# Patient Record
(demographics unavailable — no encounter records)

---

## 2024-10-02 NOTE — PHYSICAL EXAM
[No Acute Distress] : no acute distress [Normal Sclera/Conjunctiva] : normal sclera/conjunctiva [PERRL] : pupils equal round and reactive to light [EOMI] : extraocular movements intact [Normal Outer Ear/Nose] : the outer ears and nose were normal in appearance [Normal Oropharynx] : the oropharynx was normal [No JVD] : no jugular venous distention [No Lymphadenopathy] : no lymphadenopathy [Supple] : supple [Thyroid Normal, No Nodules] : the thyroid was normal and there were no nodules present [No Respiratory Distress] : no respiratory distress  [No Accessory Muscle Use] : no accessory muscle use [Clear to Auscultation] : lungs were clear to auscultation bilaterally [Normal Rate] : normal rate  [Regular Rhythm] : with a regular rhythm [Normal S1, S2] : normal S1 and S2 [No Murmur] : no murmur heard [No Varicosities] : no varicosities [Pedal Pulses Present] : the pedal pulses are present [No Edema] : there was no peripheral edema [No Extremity Clubbing/Cyanosis] : no extremity clubbing/cyanosis [Soft] : abdomen soft [Non Tender] : non-tender [Non-distended] : non-distended [No Masses] : no abdominal mass palpated [No HSM] : no HSM [Normal Bowel Sounds] : normal bowel sounds [No CVA Tenderness] : no CVA  tenderness [No Spinal Tenderness] : no spinal tenderness [No Joint Swelling] : no joint swelling [Grossly Normal Strength/Tone] : grossly normal strength/tone [No Rash] : no rash [Coordination Grossly Intact] : coordination grossly intact [No Focal Deficits] : no focal deficits [Normal Gait] : normal gait [Deep Tendon Reflexes (DTR)] : deep tendon reflexes were 2+ and symmetric [Normal Insight/Judgement] : insight and judgment were intact [Normal Affect] : the affect was normal

## 2024-10-02 NOTE — PHYSICAL EXAM
[No Acute Distress] : no acute distress [PERRL] : pupils equal round and reactive to light [Normal Sclera/Conjunctiva] : normal sclera/conjunctiva [EOMI] : extraocular movements intact [Normal Outer Ear/Nose] : the outer ears and nose were normal in appearance [Normal Oropharynx] : the oropharynx was normal [No JVD] : no jugular venous distention [No Lymphadenopathy] : no lymphadenopathy [Supple] : supple [Thyroid Normal, No Nodules] : the thyroid was normal and there were no nodules present [No Respiratory Distress] : no respiratory distress  [No Accessory Muscle Use] : no accessory muscle use [Clear to Auscultation] : lungs were clear to auscultation bilaterally [Normal Rate] : normal rate  [Regular Rhythm] : with a regular rhythm [Normal S1, S2] : normal S1 and S2 [No Murmur] : no murmur heard [No Varicosities] : no varicosities [Pedal Pulses Present] : the pedal pulses are present [No Edema] : there was no peripheral edema [No Extremity Clubbing/Cyanosis] : no extremity clubbing/cyanosis [Soft] : abdomen soft [Non Tender] : non-tender [Non-distended] : non-distended [No Masses] : no abdominal mass palpated [No HSM] : no HSM [Normal Bowel Sounds] : normal bowel sounds [No CVA Tenderness] : no CVA  tenderness [No Spinal Tenderness] : no spinal tenderness [No Joint Swelling] : no joint swelling [Grossly Normal Strength/Tone] : grossly normal strength/tone [No Rash] : no rash [Coordination Grossly Intact] : coordination grossly intact [No Focal Deficits] : no focal deficits [Normal Gait] : normal gait [Deep Tendon Reflexes (DTR)] : deep tendon reflexes were 2+ and symmetric [Normal Affect] : the affect was normal [Normal Insight/Judgement] : insight and judgment were intact

## 2024-10-08 NOTE — HEALTH RISK ASSESSMENT
[Good] : ~his/her~  mood as  good [No] : In the past 12 months have you used drugs other than those required for medical reasons? No [No falls in past year] : Patient reported no falls in the past year [0] : 2) Feeling down, depressed, or hopeless: Not at all (0) [PHQ-2 Negative - No further assessment needed] : PHQ-2 Negative - No further assessment needed [Never] : Never [Patient reported mammogram was normal] : Patient reported mammogram was normal [Patient declined colonoscopy] : Patient declined colonoscopy [With Family] : lives with family [Unemployed] : unemployed [Single] : single [Fully functional (bathing, dressing, toileting, transferring, walking, feeding)] : Fully functional (bathing, dressing, toileting, transferring, walking, feeding) [Fully functional (using the telephone, shopping, preparing meals, housekeeping, doing laundry, using] : Fully functional and needs no help or supervision to perform IADLs (using the telephone, shopping, preparing meals, housekeeping, doing laundry, using transportation, managing medications and managing finances) [[id number]] : 0 [MammogramDate] : 4/24 [AdvancecareDate] : 10/8/24 [FreeTextEntry4] : With think about it

## 2024-10-08 NOTE — HISTORY OF PRESENT ILLNESS
[de-identified] : 62 years old female has past medical history of hypothyroidism, diabetes, hyperlipidemia, bipolar disorder, hypercalcemia, thyroid nodules, osteoporosis and vitamin D deficiency, came back for annual physical exam.

## 2024-10-08 NOTE — HEALTH RISK ASSESSMENT
[Good] : ~his/her~  mood as  good [No] : In the past 12 months have you used drugs other than those required for medical reasons? No [No falls in past year] : Patient reported no falls in the past year [0] : 2) Feeling down, depressed, or hopeless: Not at all (0) [PHQ-2 Negative - No further assessment needed] : PHQ-2 Negative - No further assessment needed [Never] : Never [Patient reported mammogram was normal] : Patient reported mammogram was normal [Patient declined colonoscopy] : Patient declined colonoscopy [With Family] : lives with family [Unemployed] : unemployed [Single] : single [Fully functional (bathing, dressing, toileting, transferring, walking, feeding)] : Fully functional (bathing, dressing, toileting, transferring, walking, feeding) [Fully functional (using the telephone, shopping, preparing meals, housekeeping, doing laundry, using] : Fully functional and needs no help or supervision to perform IADLs (using the telephone, shopping, preparing meals, housekeeping, doing laundry, using transportation, managing medications and managing finances) [[id number]] : 0 [MammogramDate] : 4/24 [AdvancecareDate] : 10/8/24 [FreeTextEntry4] : With think about it

## 2024-10-08 NOTE — HISTORY OF PRESENT ILLNESS
[de-identified] : 62 years old female has past medical history of hypothyroidism, diabetes, hyperlipidemia, bipolar disorder, hypercalcemia, thyroid nodules, osteoporosis and vitamin D deficiency, came back for annual physical exam.

## 2024-10-22 NOTE — HISTORY OF PRESENT ILLNESS
[de-identified] : 62 years old female has past medical history of hypothyroidism, diabetes, hyperlipidemia, bipolar disorder, hypercalcemia, thyroid nodules, osteoporosis and vitamin D deficiency, came back to review her most recent test results.. Thyroglobulin was 0.48, TSH 0.12, RBC 5.35, , HBA1c 6.4, vitamin D 29.2. UA positive for WBC 10, few bacteria, epithelial cell 10, and large leukocyte esterase/HPF. Reports were reviewed with pt in details. Other blood tests came back wnl. Pt denied urinary tract symptoms.

## 2024-10-22 NOTE — HISTORY OF PRESENT ILLNESS
[de-identified] : 62 years old female has past medical history of hypothyroidism, diabetes, hyperlipidemia, bipolar disorder, hypercalcemia, thyroid nodules, osteoporosis and vitamin D deficiency, came back to review her most recent test results.. Thyroglobulin was 0.48, TSH 0.12, RBC 5.35, , HBA1c 6.4, vitamin D 29.2. UA positive for WBC 10, few bacteria, epithelial cell 10, and large leukocyte esterase/HPF. Reports were reviewed with pt in details. Other blood tests came back wnl. Pt denied urinary tract symptoms.

## 2025-01-21 NOTE — HISTORY OF PRESENT ILLNESS
[de-identified] : 62 years old female has past medical history of hypothyroidism, diabetes, hyperlipidemia, bipolar disorder, hypercalcemia, thyroid nodules, osteoporosis and vitamin D deficiency, came back for follow up. Pt saw endocrinologist. Alendronate was discontinued and prolia injection started. Pt received first dose of prolia injection in 2 months ago with endocrinologist. Pt has no major complaints.

## 2025-01-21 NOTE — HISTORY OF PRESENT ILLNESS
[de-identified] : 62 years old female has past medical history of hypothyroidism, diabetes, hyperlipidemia, bipolar disorder, hypercalcemia, thyroid nodules, osteoporosis and vitamin D deficiency, came back for follow up. Pt saw endocrinologist. Alendronate was discontinued and prolia injection started. Pt received first dose of prolia injection in 2 months ago with endocrinologist. Pt has no major complaints.

## 2025-01-31 NOTE — HISTORY OF PRESENT ILLNESS
[de-identified] : 62 years old female has past medical history of hypothyroidism, diabetes, hyperlipidemia, bipolar disorder, hypercalcemia, thyroid nodules, osteoporosis and vitamin D deficiency, came back to review her most recent test results.. RBC was 5.36, HBA1c 6.2, and UA positive for moderate leukocyte esterase/HPF. Reports were reviewed with pt in details. Other blood tests came back wnl. Pt denied urinary tract symptoms.

## 2025-01-31 NOTE — HISTORY OF PRESENT ILLNESS
[de-identified] : 62 years old female has past medical history of hypothyroidism, diabetes, hyperlipidemia, bipolar disorder, hypercalcemia, thyroid nodules, osteoporosis and vitamin D deficiency, came back to review her most recent test results.. RBC was 5.36, HBA1c 6.2, and UA positive for moderate leukocyte esterase/HPF. Reports were reviewed with pt in details. Other blood tests came back wnl. Pt denied urinary tract symptoms.

## 2025-04-15 NOTE — HISTORY OF PRESENT ILLNESS
[de-identified] : 62 years old female has past medical history of hypothyroidism, diabetes, hyperlipidemia, bipolar disorder, hypercalcemia, thyroid nodules, osteoporosis and vitamin D deficiency, came back for follow up.  Pt wants to see dermatologist again because her skin moles are getting worse around neck. No itching and bleeding.

## 2025-04-15 NOTE — HISTORY OF PRESENT ILLNESS
[de-identified] : 62 years old female has past medical history of hypothyroidism, diabetes, hyperlipidemia, bipolar disorder, hypercalcemia, thyroid nodules, osteoporosis and vitamin D deficiency, came back for follow up.  Pt wants to see dermatologist again because her skin moles are getting worse around neck. No itching and bleeding.

## 2025-04-24 NOTE — HISTORY OF PRESENT ILLNESS
[de-identified] : 62 years old female has past medical history of hypothyroidism, diabetes, hyperlipidemia, bipolar disorder, hypercalcemia, thyroid nodules, osteoporosis and vitamin D deficiency, came back to review her most recent test results. Vitamin B 12 is >2000, fasting glucose 104, HBA1c 6.8, and UA positive for moderate leukocyte esterase. Reports were reviewed with pt in details. Other blood tests came back wnl. Pt will get second prolia injection in early June, 2025.  
[de-identified] : 62 years old female has past medical history of hypothyroidism, diabetes, hyperlipidemia, bipolar disorder, hypercalcemia, thyroid nodules, osteoporosis and vitamin D deficiency, came back to review her most recent test results. Vitamin B 12 is >2000, fasting glucose 104, HBA1c 6.8, and UA positive for moderate leukocyte esterase. Reports were reviewed with pt in details. Other blood tests came back wnl. Pt will get second prolia injection in early June, 2025.  
pt complaining of chest pain
Lab results for venous blood gas lactate 3.0

## 2025-05-08 NOTE — ASSESSMENT
[FreeTextEntry1] : -- New lesion on the R buttock, tender, no dx or tx previously nopersonal or fhx skinCA  #SK #Tags #Cherries Benign dermoscopy today.  Benign nature discussed. Reassurance. No tx required. sun protection advised. regular skin checks. RTC if any new or changing lesion

## 2025-05-08 NOTE — HISTORY OF PRESENT ILLNESS
[FreeTextEntry1] : skin lesions [de-identified] : new pt with skin lesions of concern on neck, chest, inc in numberno sympts no personal or fhx skinCA

## 2025-07-24 NOTE — HISTORY OF PRESENT ILLNESS
[de-identified] : 62 years old female with past medical history of hypothyroidism, diabetes, hyperlipidemia, bipolar disorder, hypercalcemia, thyroid nodules, osteoporosis and vitamin D deficiency came back for follow up.

## 2025-07-24 NOTE — HISTORY OF PRESENT ILLNESS
[de-identified] : 62 years old female with past medical history of hypothyroidism, diabetes, hyperlipidemia, bipolar disorder, hypercalcemia, thyroid nodules, osteoporosis and vitamin D deficiency came back for follow up.